# Patient Record
Sex: FEMALE | Race: WHITE | NOT HISPANIC OR LATINO | Employment: FULL TIME | URBAN - METROPOLITAN AREA
[De-identification: names, ages, dates, MRNs, and addresses within clinical notes are randomized per-mention and may not be internally consistent; named-entity substitution may affect disease eponyms.]

---

## 2018-03-21 ENCOUNTER — TRANSCRIBE ORDERS (OUTPATIENT)
Dept: ADMINISTRATIVE | Facility: HOSPITAL | Age: 53
End: 2018-03-21

## 2018-03-21 DIAGNOSIS — R14.0 ABDOMINAL DISTENTION: Primary | ICD-10-CM

## 2018-03-30 ENCOUNTER — HOSPITAL ENCOUNTER (OUTPATIENT)
Dept: ULTRASOUND IMAGING | Facility: HOSPITAL | Age: 53
Discharge: HOME | End: 2018-03-30
Attending: COLON & RECTAL SURGERY | Admitting: COLON & RECTAL SURGERY

## 2018-03-30 DIAGNOSIS — R14.0 ABDOMINAL DISTENTION: ICD-10-CM

## 2018-03-30 PROCEDURE — 76705 ECHO EXAM OF ABDOMEN: CPT

## 2018-12-05 ENCOUNTER — HOSPITAL ENCOUNTER (OUTPATIENT)
Dept: CT IMAGING | Facility: HOSPITAL | Age: 53
Discharge: HOME | End: 2018-12-05
Attending: COLON & RECTAL SURGERY | Admitting: COLON & RECTAL SURGERY

## 2018-12-05 ENCOUNTER — TRANSCRIBE ORDERS (OUTPATIENT)
Dept: ADMINISTRATIVE | Facility: HOSPITAL | Age: 53
End: 2018-12-05

## 2018-12-05 DIAGNOSIS — K57.90 DIVERTICULOSIS OF INTESTINE WITHOUT PERFORATION OR ABSCESS WITHOUT BLEEDING: ICD-10-CM

## 2018-12-05 DIAGNOSIS — K57.90 DIVERTICULOSIS OF INTESTINE WITHOUT PERFORATION OR ABSCESS WITHOUT BLEEDING: Primary | ICD-10-CM

## 2018-12-05 PROCEDURE — 0 DIATRIZOATE MEGLUMINE & SODIUM PER 1 ML: Performed by: COLON & RECTAL SURGERY

## 2018-12-05 PROCEDURE — 25010000002 IOPAMIDOL 61 % SOLUTION: Performed by: COLON & RECTAL SURGERY

## 2018-12-05 PROCEDURE — 74177 CT ABD & PELVIS W/CONTRAST: CPT

## 2018-12-05 PROCEDURE — 82565 ASSAY OF CREATININE: CPT

## 2018-12-05 RX ADMIN — Medication 15 ML: at 16:10

## 2018-12-05 RX ADMIN — IOPAMIDOL 95 ML: 612 INJECTION, SOLUTION INTRAVENOUS at 17:34

## 2018-12-06 LAB — CREAT BLDA-MCNC: 0.8 MG/DL (ref 0.6–1.3)

## 2025-02-16 ENCOUNTER — APPOINTMENT (OUTPATIENT)
Dept: CT IMAGING | Facility: HOSPITAL | Age: 60
End: 2025-02-16
Payer: COMMERCIAL

## 2025-02-16 ENCOUNTER — APPOINTMENT (OUTPATIENT)
Dept: GENERAL RADIOLOGY | Facility: HOSPITAL | Age: 60
End: 2025-02-16
Payer: COMMERCIAL

## 2025-02-16 ENCOUNTER — HOSPITAL ENCOUNTER (OUTPATIENT)
Facility: HOSPITAL | Age: 60
Setting detail: OBSERVATION
Discharge: HOME | End: 2025-02-17
Attending: EMERGENCY MEDICINE | Admitting: HOSPITALIST
Payer: COMMERCIAL

## 2025-02-16 ENCOUNTER — APPOINTMENT (OUTPATIENT)
Dept: ULTRASOUND IMAGING | Facility: HOSPITAL | Age: 60
End: 2025-02-16
Payer: COMMERCIAL

## 2025-02-16 DIAGNOSIS — A09 INFECTIOUS ENTERITIS, UNSPECIFIED INFECTIOUS AGENT: Primary | ICD-10-CM

## 2025-02-16 DIAGNOSIS — R10.9 INTRACTABLE ABDOMINAL PAIN: ICD-10-CM

## 2025-02-16 DIAGNOSIS — K52.9 ENTERITIS: ICD-10-CM

## 2025-02-16 DIAGNOSIS — R10.9 ACUTE ABDOMINAL PAIN: ICD-10-CM

## 2025-02-16 LAB
ALBUMIN SERPL-MCNC: 4.1 G/DL (ref 3.5–5.2)
ALBUMIN/GLOB SERPL: 1.5 G/DL
ALP SERPL-CCNC: 94 U/L (ref 39–117)
ALT SERPL W P-5'-P-CCNC: 10 U/L (ref 1–33)
ANION GAP SERPL CALCULATED.3IONS-SCNC: 11 MMOL/L (ref 5–15)
AST SERPL-CCNC: 12 U/L (ref 1–32)
BACTERIA UR QL AUTO: ABNORMAL /HPF
BASOPHILS # BLD AUTO: 0.07 10*3/MM3 (ref 0–0.2)
BASOPHILS NFR BLD AUTO: 0.3 % (ref 0–1.5)
BILIRUB SERPL-MCNC: 0.5 MG/DL (ref 0–1.2)
BILIRUB UR QL STRIP: NEGATIVE
BUN SERPL-MCNC: 25 MG/DL (ref 8–23)
BUN/CREAT SERPL: 19.4 (ref 7–25)
CALCIUM SPEC-SCNC: 9.6 MG/DL (ref 8.6–10.5)
CHLORIDE SERPL-SCNC: 98 MMOL/L (ref 98–107)
CLARITY UR: ABNORMAL
CO2 SERPL-SCNC: 27 MMOL/L (ref 22–29)
COLOR UR: YELLOW
CREAT SERPL-MCNC: 1.29 MG/DL (ref 0.57–1)
D-LACTATE SERPL-SCNC: 1.6 MMOL/L (ref 0.5–2)
DEPRECATED RDW RBC AUTO: 40.7 FL (ref 37–54)
EGFRCR SERPLBLD CKD-EPI 2021: 47.3 ML/MIN/1.73
EOSINOPHIL # BLD AUTO: 0.11 10*3/MM3 (ref 0–0.4)
EOSINOPHIL NFR BLD AUTO: 0.5 % (ref 0.3–6.2)
ERYTHROCYTE [DISTWIDTH] IN BLOOD BY AUTOMATED COUNT: 13 % (ref 12.3–15.4)
GEN 5 1HR TROPONIN T REFLEX: 9 NG/L
GLOBULIN UR ELPH-MCNC: 2.7 GM/DL
GLUCOSE SERPL-MCNC: 127 MG/DL (ref 65–99)
GLUCOSE UR STRIP-MCNC: NEGATIVE MG/DL
HCT VFR BLD AUTO: 48.5 % (ref 34–46.6)
HGB BLD-MCNC: 15.9 G/DL (ref 12–15.9)
HGB UR QL STRIP.AUTO: ABNORMAL
HOLD SPECIMEN: NORMAL
HYALINE CASTS UR QL AUTO: ABNORMAL /LPF
IMM GRANULOCYTES # BLD AUTO: 0.1 10*3/MM3 (ref 0–0.05)
IMM GRANULOCYTES NFR BLD AUTO: 0.5 % (ref 0–0.5)
KETONES UR QL STRIP: ABNORMAL
LEUKOCYTE ESTERASE UR QL STRIP.AUTO: ABNORMAL
LIPASE SERPL-CCNC: 51 U/L (ref 13–60)
LYMPHOCYTES # BLD AUTO: 3.35 10*3/MM3 (ref 0.7–3.1)
LYMPHOCYTES NFR BLD AUTO: 15.8 % (ref 19.6–45.3)
MCH RBC QN AUTO: 28.4 PG (ref 26.6–33)
MCHC RBC AUTO-ENTMCNC: 32.8 G/DL (ref 31.5–35.7)
MCV RBC AUTO: 86.8 FL (ref 79–97)
MONOCYTES # BLD AUTO: 0.87 10*3/MM3 (ref 0.1–0.9)
MONOCYTES NFR BLD AUTO: 4.1 % (ref 5–12)
NEUTROPHILS NFR BLD AUTO: 16.75 10*3/MM3 (ref 1.7–7)
NEUTROPHILS NFR BLD AUTO: 78.8 % (ref 42.7–76)
NITRITE UR QL STRIP: NEGATIVE
NRBC BLD AUTO-RTO: 0 /100 WBC (ref 0–0.2)
PH UR STRIP.AUTO: <=5 [PH] (ref 5–8)
PLATELET # BLD AUTO: 480 10*3/MM3 (ref 140–450)
PMV BLD AUTO: 9.1 FL (ref 6–12)
POTASSIUM SERPL-SCNC: 4.4 MMOL/L (ref 3.5–5.2)
PROT SERPL-MCNC: 6.8 G/DL (ref 6–8.5)
PROT UR QL STRIP: ABNORMAL
RBC # BLD AUTO: 5.59 10*6/MM3 (ref 3.77–5.28)
RBC # UR STRIP: ABNORMAL /HPF
REF LAB TEST METHOD: ABNORMAL
SODIUM SERPL-SCNC: 136 MMOL/L (ref 136–145)
SP GR UR STRIP: 1.02 (ref 1–1.03)
SQUAMOUS #/AREA URNS HPF: ABNORMAL /HPF
TROPONIN T % DELTA: -40
TROPONIN T NUMERIC DELTA: -6 NG/L
TROPONIN T SERPL HS-MCNC: 15 NG/L
UROBILINOGEN UR QL STRIP: ABNORMAL
WBC # UR STRIP: ABNORMAL /HPF
WBC NRBC COR # BLD AUTO: 21.25 10*3/MM3 (ref 3.4–10.8)
WHOLE BLOOD HOLD COAG: NORMAL
WHOLE BLOOD HOLD SPECIMEN: NORMAL

## 2025-02-16 PROCEDURE — 76705 ECHO EXAM OF ABDOMEN: CPT

## 2025-02-16 PROCEDURE — 93005 ELECTROCARDIOGRAM TRACING: CPT | Performed by: EMERGENCY MEDICINE

## 2025-02-16 PROCEDURE — G0378 HOSPITAL OBSERVATION PER HR: HCPCS

## 2025-02-16 PROCEDURE — 87040 BLOOD CULTURE FOR BACTERIA: CPT | Performed by: EMERGENCY MEDICINE

## 2025-02-16 PROCEDURE — 84484 ASSAY OF TROPONIN QUANT: CPT | Performed by: EMERGENCY MEDICINE

## 2025-02-16 PROCEDURE — 83605 ASSAY OF LACTIC ACID: CPT | Performed by: EMERGENCY MEDICINE

## 2025-02-16 PROCEDURE — 25810000003 SODIUM CHLORIDE 0.9 % SOLUTION: Performed by: EMERGENCY MEDICINE

## 2025-02-16 PROCEDURE — 25510000001 IOPAMIDOL 61 % SOLUTION: Performed by: EMERGENCY MEDICINE

## 2025-02-16 PROCEDURE — 85025 COMPLETE CBC W/AUTO DIFF WBC: CPT | Performed by: EMERGENCY MEDICINE

## 2025-02-16 PROCEDURE — 96367 TX/PROPH/DG ADDL SEQ IV INF: CPT

## 2025-02-16 PROCEDURE — 99285 EMERGENCY DEPT VISIT HI MDM: CPT

## 2025-02-16 PROCEDURE — 25010000002 METRONIDAZOLE 500 MG/100ML SOLUTION: Performed by: EMERGENCY MEDICINE

## 2025-02-16 PROCEDURE — 87086 URINE CULTURE/COLONY COUNT: CPT | Performed by: EMERGENCY MEDICINE

## 2025-02-16 PROCEDURE — 36415 COLL VENOUS BLD VENIPUNCTURE: CPT

## 2025-02-16 PROCEDURE — 83690 ASSAY OF LIPASE: CPT | Performed by: EMERGENCY MEDICINE

## 2025-02-16 PROCEDURE — 71045 X-RAY EXAM CHEST 1 VIEW: CPT

## 2025-02-16 PROCEDURE — 74177 CT ABD & PELVIS W/CONTRAST: CPT

## 2025-02-16 PROCEDURE — 96365 THER/PROPH/DIAG IV INF INIT: CPT

## 2025-02-16 PROCEDURE — 81001 URINALYSIS AUTO W/SCOPE: CPT | Performed by: EMERGENCY MEDICINE

## 2025-02-16 PROCEDURE — 25810000003 SODIUM CHLORIDE 0.9 % SOLUTION: Performed by: INTERNAL MEDICINE

## 2025-02-16 PROCEDURE — 96375 TX/PRO/DX INJ NEW DRUG ADDON: CPT

## 2025-02-16 PROCEDURE — 25010000002 ONDANSETRON PER 1 MG: Performed by: EMERGENCY MEDICINE

## 2025-02-16 PROCEDURE — 80053 COMPREHEN METABOLIC PANEL: CPT | Performed by: EMERGENCY MEDICINE

## 2025-02-16 PROCEDURE — 25010000002 MORPHINE PER 10 MG: Performed by: EMERGENCY MEDICINE

## 2025-02-16 PROCEDURE — 25010000002 CEFEPIME PER 500 MG: Performed by: EMERGENCY MEDICINE

## 2025-02-16 RX ORDER — METOCLOPRAMIDE HYDROCHLORIDE 5 MG/ML
10 INJECTION INTRAMUSCULAR; INTRAVENOUS ONCE
Status: DISCONTINUED | OUTPATIENT
Start: 2025-02-17 | End: 2025-02-17 | Stop reason: HOSPADM

## 2025-02-16 RX ORDER — SODIUM CHLORIDE 0.9 % (FLUSH) 0.9 %
10 SYRINGE (ML) INJECTION EVERY 12 HOURS SCHEDULED
Status: DISCONTINUED | OUTPATIENT
Start: 2025-02-16 | End: 2025-02-17 | Stop reason: HOSPADM

## 2025-02-16 RX ORDER — SODIUM CHLORIDE 0.9 % (FLUSH) 0.9 %
10 SYRINGE (ML) INJECTION AS NEEDED
Status: DISCONTINUED | OUTPATIENT
Start: 2025-02-16 | End: 2025-02-17 | Stop reason: HOSPADM

## 2025-02-16 RX ORDER — NITROGLYCERIN 0.4 MG/1
0.4 TABLET SUBLINGUAL
Status: DISCONTINUED | OUTPATIENT
Start: 2025-02-16 | End: 2025-02-17 | Stop reason: HOSPADM

## 2025-02-16 RX ORDER — LISINOPRIL 40 MG/1
20 TABLET ORAL DAILY
COMMUNITY
Start: 2025-01-07

## 2025-02-16 RX ORDER — MORPHINE SULFATE 2 MG/ML
2 INJECTION, SOLUTION INTRAMUSCULAR; INTRAVENOUS EVERY 4 HOURS PRN
Status: DISCONTINUED | OUTPATIENT
Start: 2025-02-16 | End: 2025-02-17 | Stop reason: HOSPADM

## 2025-02-16 RX ORDER — SODIUM CHLORIDE 9 MG/ML
40 INJECTION, SOLUTION INTRAVENOUS AS NEEDED
Status: DISCONTINUED | OUTPATIENT
Start: 2025-02-16 | End: 2025-02-17 | Stop reason: HOSPADM

## 2025-02-16 RX ORDER — SODIUM CHLORIDE 9 MG/ML
10 INJECTION, SOLUTION INTRAMUSCULAR; INTRAVENOUS; SUBCUTANEOUS AS NEEDED
Status: DISCONTINUED | OUTPATIENT
Start: 2025-02-16 | End: 2025-02-17 | Stop reason: HOSPADM

## 2025-02-16 RX ORDER — HYDROCHLOROTHIAZIDE 12.5 MG/1
12.5 CAPSULE ORAL EVERY MORNING
COMMUNITY
Start: 2024-11-07

## 2025-02-16 RX ORDER — ASPIRIN 81 MG/1
1 TABLET ORAL DAILY
COMMUNITY

## 2025-02-16 RX ORDER — MORPHINE SULFATE 4 MG/ML
4 INJECTION, SOLUTION INTRAMUSCULAR; INTRAVENOUS ONCE
Status: COMPLETED | OUTPATIENT
Start: 2025-02-16 | End: 2025-02-16

## 2025-02-16 RX ORDER — SODIUM CHLORIDE 9 MG/ML
100 INJECTION, SOLUTION INTRAVENOUS CONTINUOUS
Status: ACTIVE | OUTPATIENT
Start: 2025-02-16 | End: 2025-02-17

## 2025-02-16 RX ORDER — METRONIDAZOLE 500 MG/100ML
500 INJECTION, SOLUTION INTRAVENOUS ONCE
Status: COMPLETED | OUTPATIENT
Start: 2025-02-16 | End: 2025-02-16

## 2025-02-16 RX ORDER — LEVOTHYROXINE SODIUM 100 UG/1
1 TABLET ORAL DAILY
COMMUNITY

## 2025-02-16 RX ORDER — ONDANSETRON 2 MG/ML
4 INJECTION INTRAMUSCULAR; INTRAVENOUS ONCE
Status: COMPLETED | OUTPATIENT
Start: 2025-02-16 | End: 2025-02-16

## 2025-02-16 RX ORDER — ACETAMINOPHEN 650 MG/1
650 SUPPOSITORY RECTAL EVERY 4 HOURS PRN
Status: DISCONTINUED | OUTPATIENT
Start: 2025-02-16 | End: 2025-02-17 | Stop reason: HOSPADM

## 2025-02-16 RX ORDER — HYDROXYZINE HYDROCHLORIDE 10 MG/1
1 TABLET, FILM COATED ORAL 3 TIMES DAILY
COMMUNITY

## 2025-02-16 RX ORDER — IOPAMIDOL 612 MG/ML
100 INJECTION, SOLUTION INTRAVASCULAR
Status: COMPLETED | OUTPATIENT
Start: 2025-02-16 | End: 2025-02-16

## 2025-02-16 RX ORDER — HYDROCODONE BITARTRATE AND ACETAMINOPHEN 5; 325 MG/1; MG/1
1 TABLET ORAL EVERY 6 HOURS PRN
Status: DISCONTINUED | OUTPATIENT
Start: 2025-02-16 | End: 2025-02-17 | Stop reason: HOSPADM

## 2025-02-16 RX ORDER — DILTIAZEM HYDROCHLORIDE EXTENDED-RELEASE TABLETS 300 MG/1
1 TABLET, EXTENDED RELEASE ORAL DAILY
COMMUNITY
Start: 2025-02-10

## 2025-02-16 RX ORDER — NALOXONE HCL 0.4 MG/ML
0.4 VIAL (ML) INJECTION
Status: DISCONTINUED | OUTPATIENT
Start: 2025-02-16 | End: 2025-02-17 | Stop reason: HOSPADM

## 2025-02-16 RX ORDER — ONDANSETRON 2 MG/ML
4 INJECTION INTRAMUSCULAR; INTRAVENOUS EVERY 6 HOURS PRN
Status: DISCONTINUED | OUTPATIENT
Start: 2025-02-16 | End: 2025-02-17 | Stop reason: HOSPADM

## 2025-02-16 RX ORDER — ACETAMINOPHEN 160 MG/5ML
650 SOLUTION ORAL EVERY 4 HOURS PRN
Status: DISCONTINUED | OUTPATIENT
Start: 2025-02-16 | End: 2025-02-17 | Stop reason: HOSPADM

## 2025-02-16 RX ORDER — ACETAMINOPHEN 325 MG/1
650 TABLET ORAL EVERY 4 HOURS PRN
Status: DISCONTINUED | OUTPATIENT
Start: 2025-02-16 | End: 2025-02-17 | Stop reason: HOSPADM

## 2025-02-16 RX ADMIN — SODIUM CHLORIDE 1000 ML: 9 INJECTION, SOLUTION INTRAVENOUS at 19:25

## 2025-02-16 RX ADMIN — HYDROCODONE BITARTRATE AND ACETAMINOPHEN 1 TABLET: 5; 325 TABLET ORAL at 22:19

## 2025-02-16 RX ADMIN — ONDANSETRON 4 MG: 2 INJECTION INTRAMUSCULAR; INTRAVENOUS at 19:27

## 2025-02-16 RX ADMIN — MORPHINE SULFATE 4 MG: 4 INJECTION, SOLUTION INTRAMUSCULAR; INTRAVENOUS at 19:27

## 2025-02-16 RX ADMIN — CEFEPIME 2000 MG: 2 INJECTION, POWDER, FOR SOLUTION INTRAVENOUS at 20:17

## 2025-02-16 RX ADMIN — SODIUM CHLORIDE 100 ML/HR: 9 INJECTION, SOLUTION INTRAVENOUS at 23:15

## 2025-02-16 RX ADMIN — METRONIDAZOLE 500 MG: 500 INJECTION, SOLUTION INTRAVENOUS at 20:58

## 2025-02-16 RX ADMIN — IOPAMIDOL 75 ML: 612 INJECTION, SOLUTION INTRAVENOUS at 19:43

## 2025-02-17 VITALS
HEART RATE: 65 BPM | BODY MASS INDEX: 30.12 KG/M2 | HEIGHT: 63 IN | DIASTOLIC BLOOD PRESSURE: 72 MMHG | TEMPERATURE: 98 F | OXYGEN SATURATION: 94 % | SYSTOLIC BLOOD PRESSURE: 114 MMHG | RESPIRATION RATE: 14 BRPM | WEIGHT: 170 LBS

## 2025-02-17 PROBLEM — K52.9 ENTERITIS: Status: RESOLVED | Noted: 2025-02-16 | Resolved: 2025-02-17

## 2025-02-17 LAB
ALBUMIN SERPL-MCNC: 3.4 G/DL (ref 3.5–5.2)
ALBUMIN/GLOB SERPL: 1.5 G/DL
ALP SERPL-CCNC: 73 U/L (ref 39–117)
ALT SERPL W P-5'-P-CCNC: 8 U/L (ref 1–33)
ANION GAP SERPL CALCULATED.3IONS-SCNC: 5 MMOL/L (ref 5–15)
AST SERPL-CCNC: 9 U/L (ref 1–32)
BACTERIA SPEC AEROBE CULT: ABNORMAL
BASOPHILS # BLD AUTO: 0.06 10*3/MM3 (ref 0–0.2)
BASOPHILS NFR BLD AUTO: 0.5 % (ref 0–1.5)
BILIRUB SERPL-MCNC: 0.5 MG/DL (ref 0–1.2)
BUN SERPL-MCNC: 19 MG/DL (ref 8–23)
BUN/CREAT SERPL: 19.6 (ref 7–25)
C DIFF TOX GENS STL QL NAA+PROBE: NOT DETECTED
CALCIUM SPEC-SCNC: 8.2 MG/DL (ref 8.6–10.5)
CHLORIDE SERPL-SCNC: 107 MMOL/L (ref 98–107)
CO2 SERPL-SCNC: 27 MMOL/L (ref 22–29)
CREAT SERPL-MCNC: 0.97 MG/DL (ref 0.57–1)
DEPRECATED RDW RBC AUTO: 42.5 FL (ref 37–54)
EGFRCR SERPLBLD CKD-EPI 2021: 66.6 ML/MIN/1.73
EOSINOPHIL # BLD AUTO: 0.36 10*3/MM3 (ref 0–0.4)
EOSINOPHIL NFR BLD AUTO: 3.2 % (ref 0.3–6.2)
ERYTHROCYTE [DISTWIDTH] IN BLOOD BY AUTOMATED COUNT: 13.1 % (ref 12.3–15.4)
GLOBULIN UR ELPH-MCNC: 2.2 GM/DL
GLUCOSE SERPL-MCNC: 99 MG/DL (ref 65–99)
HCT VFR BLD AUTO: 38.2 % (ref 34–46.6)
HGB BLD-MCNC: 12.3 G/DL (ref 12–15.9)
IMM GRANULOCYTES # BLD AUTO: 0.03 10*3/MM3 (ref 0–0.05)
IMM GRANULOCYTES NFR BLD AUTO: 0.3 % (ref 0–0.5)
LYMPHOCYTES # BLD AUTO: 4.25 10*3/MM3 (ref 0.7–3.1)
LYMPHOCYTES NFR BLD AUTO: 37.5 % (ref 19.6–45.3)
MAGNESIUM SERPL-MCNC: 2.2 MG/DL (ref 1.6–2.4)
MCH RBC QN AUTO: 28.7 PG (ref 26.6–33)
MCHC RBC AUTO-ENTMCNC: 32.2 G/DL (ref 31.5–35.7)
MCV RBC AUTO: 89.3 FL (ref 79–97)
MONOCYTES # BLD AUTO: 0.74 10*3/MM3 (ref 0.1–0.9)
MONOCYTES NFR BLD AUTO: 6.5 % (ref 5–12)
NEUTROPHILS NFR BLD AUTO: 5.88 10*3/MM3 (ref 1.7–7)
NEUTROPHILS NFR BLD AUTO: 52 % (ref 42.7–76)
NRBC BLD AUTO-RTO: 0 /100 WBC (ref 0–0.2)
PLATELET # BLD AUTO: 295 10*3/MM3 (ref 140–450)
PMV BLD AUTO: 9.2 FL (ref 6–12)
POTASSIUM SERPL-SCNC: 3.7 MMOL/L (ref 3.5–5.2)
PROT SERPL-MCNC: 5.6 G/DL (ref 6–8.5)
QT INTERVAL: 422 MS
QTC INTERVAL: 455 MS
RBC # BLD AUTO: 4.28 10*6/MM3 (ref 3.77–5.28)
SODIUM SERPL-SCNC: 139 MMOL/L (ref 136–145)
WBC NRBC COR # BLD AUTO: 11.32 10*3/MM3 (ref 3.4–10.8)

## 2025-02-17 PROCEDURE — 87493 C DIFF AMPLIFIED PROBE: CPT | Performed by: EMERGENCY MEDICINE

## 2025-02-17 PROCEDURE — 80053 COMPREHEN METABOLIC PANEL: CPT | Performed by: INTERNAL MEDICINE

## 2025-02-17 PROCEDURE — 99204 OFFICE O/P NEW MOD 45 MIN: CPT | Performed by: NURSE PRACTITIONER

## 2025-02-17 PROCEDURE — 25010000002 MORPHINE PER 10 MG: Performed by: INTERNAL MEDICINE

## 2025-02-17 PROCEDURE — 96376 TX/PRO/DX INJ SAME DRUG ADON: CPT

## 2025-02-17 PROCEDURE — 25810000003 SODIUM CHLORIDE 0.9 % SOLUTION: Performed by: INTERNAL MEDICINE

## 2025-02-17 PROCEDURE — 83735 ASSAY OF MAGNESIUM: CPT | Performed by: INTERNAL MEDICINE

## 2025-02-17 PROCEDURE — 25010000002 ONDANSETRON PER 1 MG: Performed by: INTERNAL MEDICINE

## 2025-02-17 PROCEDURE — G0378 HOSPITAL OBSERVATION PER HR: HCPCS

## 2025-02-17 PROCEDURE — 85025 COMPLETE CBC W/AUTO DIFF WBC: CPT | Performed by: INTERNAL MEDICINE

## 2025-02-17 RX ORDER — ONDANSETRON 4 MG/1
4 TABLET, ORALLY DISINTEGRATING ORAL EVERY 8 HOURS PRN
Qty: 15 TABLET | Refills: 0 | Status: SHIPPED | OUTPATIENT
Start: 2025-02-17

## 2025-02-17 RX ORDER — DICYCLOMINE HYDROCHLORIDE 10 MG/1
10 CAPSULE ORAL 4 TIMES DAILY PRN
Qty: 20 CAPSULE | Refills: 0 | Status: SHIPPED | OUTPATIENT
Start: 2025-02-17

## 2025-02-17 RX ORDER — ASPIRIN 81 MG/1
81 TABLET ORAL DAILY
Status: DISCONTINUED | OUTPATIENT
Start: 2025-02-17 | End: 2025-02-17 | Stop reason: HOSPADM

## 2025-02-17 RX ORDER — LISINOPRIL 20 MG/1
20 TABLET ORAL DAILY
Status: DISCONTINUED | OUTPATIENT
Start: 2025-02-18 | End: 2025-02-17 | Stop reason: HOSPADM

## 2025-02-17 RX ORDER — LEVOTHYROXINE SODIUM 100 UG/1
100 TABLET ORAL DAILY
Status: DISCONTINUED | OUTPATIENT
Start: 2025-02-17 | End: 2025-02-17 | Stop reason: HOSPADM

## 2025-02-17 RX ORDER — DICYCLOMINE HYDROCHLORIDE 10 MG/1
10 CAPSULE ORAL 4 TIMES DAILY
Status: DISCONTINUED | OUTPATIENT
Start: 2025-02-17 | End: 2025-02-17 | Stop reason: HOSPADM

## 2025-02-17 RX ADMIN — MORPHINE SULFATE 2 MG: 2 INJECTION, SOLUTION INTRAMUSCULAR; INTRAVENOUS at 07:50

## 2025-02-17 RX ADMIN — Medication 10 ML: at 09:32

## 2025-02-17 RX ADMIN — SODIUM CHLORIDE 100 ML/HR: 9 INJECTION, SOLUTION INTRAVENOUS at 09:31

## 2025-02-17 RX ADMIN — MORPHINE SULFATE 2 MG: 2 INJECTION, SOLUTION INTRAMUSCULAR; INTRAVENOUS at 01:13

## 2025-02-17 RX ADMIN — ONDANSETRON 4 MG: 2 INJECTION INTRAMUSCULAR; INTRAVENOUS at 07:50

## 2025-02-17 RX ADMIN — ONDANSETRON 4 MG: 2 INJECTION INTRAMUSCULAR; INTRAVENOUS at 01:13

## 2025-02-17 RX ADMIN — HYDROCODONE BITARTRATE AND ACETAMINOPHEN 1 TABLET: 5; 325 TABLET ORAL at 13:32

## 2025-02-17 NOTE — ED NOTES
" Cammie BYRD Maix    Nursing Report ED to Floor:  Mental status: alert and oriented x4  Ambulatory status: adlib  Oxygen Therapy:  room air  Cardiac Rhythm: normal sinus  Admitted from: ed/home  Safety Concerns:  na  Precautions: c-diff rule out  Social Issues: na  ED Room #:  33    ED Nurse Phone Extension - [phone] or may call [phone].      HPI:   Chief Complaint   Patient presents with    Abdominal Pain       Past Medical History:  No past medical history on file.     Past Surgical History:  No past surgical history on file.     Admitting Doctor:   Guzman Wallace III, DO    Consulting Provider(s):  Consults       Date and Time Order Name Status Description    2/16/2025  8:29 PM Inpatient Gastroenterology Consult               Admitting Diagnosis:   The primary encounter diagnosis was Infectious enteritis, unspecified infectious agent. Diagnoses of Acute abdominal pain and Intractable abdominal pain were also pertinent to this visit.    Most Recent Vitals:   Vitals:    02/16/25 1801 02/16/25 1823 02/16/25 1840   BP: 119/82 115/53 125/77   BP Location: Left arm     Patient Position: Sitting     Pulse: 88 78 76   Resp: 16     Temp: 98.5 °F (36.9 °C)     TempSrc: Oral     SpO2: 95% 96% 98%   Weight: 77.1 kg (170 lb)     Height: 160 cm (63\")         Active LDAs/IV Access:   Lines, Drains & Airways       Active LDAs       Name Placement date Placement time Site Days    Peripheral IV 02/16/25 1821 Anterior;Right Forearm 02/16/25  1821  Forearm  less than 1                    Labs (abnormal labs have a star):   Labs Reviewed   COMPREHENSIVE METABOLIC PANEL - Abnormal; Notable for the following components:       Result Value    Glucose 127 (*)     BUN 25 (*)     Creatinine 1.29 (*)     eGFR 47.3 (*)     All other components within normal limits    Narrative:     GFR Categories in Chronic Kidney Disease (CKD)      GFR Category          GFR (mL/min/1.73)    Interpretation  G1                     90 or greater         Normal or " high (1)  G2                      60-89                Mild decrease (1)  G3a                   45-59                Mild to moderate decrease  G3b                   30-44                Moderate to severe decrease  G4                    15-29                Severe decrease  G5                    14 or less           Kidney failure          (1)In the absence of evidence of kidney disease, neither GFR category G1 or G2 fulfill the criteria for CKD.    eGFR calculation 2021 CKD-EPI creatinine equation, which does not include race as a factor   URINALYSIS W/ MICROSCOPIC IF INDICATED (NO CULTURE) - Abnormal; Notable for the following components:    Appearance, UA Cloudy (*)     Ketones, UA Trace (*)     Blood, UA Trace (*)     Protein,  mg/dL (2+) (*)     Leuk Esterase, UA Trace (*)     All other components within normal limits   CBC WITH AUTO DIFFERENTIAL - Abnormal; Notable for the following components:    WBC 21.25 (*)     RBC 5.59 (*)     Hematocrit 48.5 (*)     Platelets 480 (*)     Neutrophil % 78.8 (*)     Lymphocyte % 15.8 (*)     Monocyte % 4.1 (*)     Neutrophils, Absolute 16.75 (*)     Lymphocytes, Absolute 3.35 (*)     Immature Grans, Absolute 0.10 (*)     All other components within normal limits   URINALYSIS, MICROSCOPIC ONLY - Abnormal; Notable for the following components:    WBC, UA 6-10 (*)     Bacteria, UA 1+ (*)     Squamous Epithelial Cells, UA 7-12 (*)     All other components within normal limits   TROPONIN - Abnormal; Notable for the following components:    HS Troponin T 15 (*)     All other components within normal limits    Narrative:     High Sensitive Troponin T Reference Range:  <14.0 ng/L- Negative Female for AMI  <22.0 ng/L- Negative Male for AMI  >=14 - Abnormal Female indicating possible myocardial injury.  >=22 - Abnormal Male indicating possible myocardial injury.   Clinicians would have to utilize clinical acumen, EKG, Troponin, and serial changes to determine if it is an Acute  Myocardial Infarction or myocardial injury due to an underlying chronic condition.        LIPASE - Normal   LACTIC ACID, PLASMA - Normal   URINE CULTURE   BLOOD CULTURE   BLOOD CULTURE   CLOSTRIDIOIDES DIFFICILE TOXIN    Narrative:     The following orders were created for panel order Clostridioides difficile Toxin - Stool, Per Rectum.  Procedure                               Abnormality         Status                     ---------                               -----------         ------                     Clostridioides difficile...[[id number]]                                                   Please view results for these tests on the individual orders.   GASTROINTESTINAL PANEL, PCR (PREFERRED) DOES NOT INCLUDE CDIFF   CLOSTRIDIOIDES DIFFICILE TOXIN, PCR   RAINBOW DRAW    Narrative:     The following orders were created for panel order [place] Draw.  Procedure                               Abnormality         Status                     ---------                               -----------         ------                     Green Top (Gel)[[id number]]                                  Final result               Lavender Top[[id number]]                                     Final result               Gold Top - SST[[id number]]                                   Final result               [place] Top[[id number]]                                         Final result               Light Blue Top[[id number]]                                   Final result                 Please view results for these tests on the individual orders.   HIGH SENSITIVITIY TROPONIN T 1HR    Narrative:     High Sensitive Troponin T Reference Range:  <14.0 ng/L- Negative Female for AMI  <22.0 ng/L- Negative Male for AMI  >=14 - Abnormal Female indicating possible myocardial injury.  >=22 - Abnormal Male indicating possible myocardial injury.   Clinicians would have to utilize clinical acumen, EKG, Troponin, and serial changes to determine if it is an Acute  Myocardial Infarction or myocardial injury due to an underlying chronic condition.        CBC AND DIFFERENTIAL    Narrative:     The following orders were created for panel order CBC & Differential.  Procedure                               Abnormality         Status                     ---------                               -----------         ------                     CBC Auto Differential[[id number]]        Abnormal            Final result                 Please view results for these tests on the individual orders.   GREEN TOP   LAVENDER TOP   GOLD TOP - SST   GRAY TOP   LIGHT BLUE TOP       Meds Given in ED:   Medications   Sodium Chloride (PF) 0.9 % 10 mL (has no administration in time range)   sodium chloride 0.9 % flush 10 mL (has no administration in time range)   metroNIDAZOLE (FLAGYL) IVPB 500 mg (has no administration in time range)   sodium chloride 0.9 % flush 10 mL (has no administration in time range)   sodium chloride 0.9 % flush 10 mL (has no administration in time range)   sodium chloride 0.9 % infusion 40 mL (has no administration in time range)   nitroglycerin (NITROSTAT) SL tablet 0.4 mg (has no administration in time range)   sodium chloride 0.9 % infusion (has no administration in time range)   Potassium Replacement - Follow Nurse / BPA Driven Protocol (has no administration in time range)   Magnesium Standard Dose Replacement - Follow Nurse / BPA Driven Protocol (has no administration in time range)   Phosphorus Replacement - Follow Nurse / BPA Driven Protocol (has no administration in time range)   Calcium Replacement - Follow Nurse / BPA Driven Protocol (has no administration in time range)   acetaminophen (TYLENOL) tablet 650 mg (has no administration in time range)     Or   acetaminophen (TYLENOL) 160 MG/5ML oral solution 650 mg (has no administration in time range)     Or   acetaminophen (TYLENOL) suppository 650 mg (has no administration in time range)   HYDROcodone-acetaminophen  (NORCO) 5-325 MG per tablet 1 tablet (has no administration in time range)   morphine injection 2 mg (has no administration in time range)     And   naloxone (NARCAN) injection 0.4 mg (has no administration in time range)   melatonin tablet 5 mg (has no administration in time range)   ondansetron (ZOFRAN) injection 4 mg (has no administration in time range)   sodium chloride 0.9 % bolus 1,000 mL (1,000 mL Intravenous New Bag 2/16/25 1925)   Morphine sulfate (PF) injection 4 mg (4 mg Intravenous Given 2/16/25 1927)   ondansetron (ZOFRAN) injection 4 mg (4 mg Intravenous Given 2/16/25 1927)   iopamidol (ISOVUE-300) 61 % injection 100 mL (75 mL Intravenous Given 2/16/25 1943)   cefepime 2000 mg IVPB in 100 mL NS (MBP) (2,000 mg Intravenous New Bag 2/16/25 2017)     sodium chloride, 100 mL/hr         Last NIH score:                                                          Dysphagia screening results:        Jo-Ann Coma Scale:  No data recorded     CIWA:        Restraint Type:            Isolation Status:  No active isolations

## 2025-02-17 NOTE — H&P
"    [facility] Medicine Services  HISTORY AND PHYSICAL    Patient Name: Cammie German  : 1965  MRN: [id number]  Primary Care Physician: Romulo Woody MD  Date of admission: 2025      Subjective   Subjective     Chief Complaint:  Abdominal pain    HPI:  Cammie German is a 59 y.o. female who states that she ate at a restaurant on Friday (2 days ago) and within 2 hours developed sensation of bloating in her abdomen.  She states that this was soon followed by nausea and eventually emesis (no hina blood).  The nausea continued into Saturday (yesterday), at which time she states she had onset of marked fatigue/malaise, and then onset of diarrhea.  She describes the diarrhea as \"light brown water.\"  She states she cannot keep any food or fluids down, as this causes her to have immediate nausea/emesis and diarrhea.  She had an episode of chills yesterday and today () but did not take a temperature at home.  She states she has had abdominal pain for the last 2 days which she describes as cramping, located all across the anterior aspect of her abdomen.  She also confirms that she recently finished a 10-day course of antibiotics for an upper respiratory infection.  She denies chest pain, shortness of breath, slurred speech/facial droop, dizziness, lightheadedness, or syncope.  Her medical history significant for hypertension, hypothyroidism, and breast cancer in  for which treatment is complete.      Personal History     Past medical history is as above in the HPI.        Family History: Mother had diabetes, father had prostate cancer    Social History:  She is a former smoker, rare alcohol use, no street drug/illicit substance use.  Social History     Social History Narrative    Not on file       Medications:  Available home medication information reviewed.       No Known Allergies    Objective   Objective     Vital Signs:   Temp:  [98.5 °F (36.9 °C)] 98.5 °F (36.9 " °C)  Heart Rate:  [76-88] 76  Resp:  [16] 16  BP: (115-125)/(53-82) 125/77       Physical Exam   Constitutional: Awake, alert, NAD, pleasant.  Eyes: PERRLA, sclerae anicteric, no conjunctival injection  HENT: NCAT, mucous membranes dry.  Neck: Supple, no thyromegaly, no lymphadenopathy, trachea midline  Respiratory: Clear to auscultation bilaterally, nonlabored respirations   Cardiovascular: RRR, no murmurs, rubs, or gallops, palpable pedal pulses bilaterally  Gastrointestinal: Positive hypoactive bowel sounds, soft, generalized TTP, no peritoneal signs, nondistended  Musculoskeletal: No bilateral ankle edema, no clubbing or cyanosis to extremities  Psychiatric: Appropriate affect, cooperative  Neurologic: Oriented x 3, strength symmetric in all extremities, Cranial Nerves grossly intact to confrontation, speech clear  Skin: No rashes, normal turgor.    Result Review:  I have personally reviewed the results from the time of this admission to 2/16/2025 21:53 EST and agree with these findings:  [x]  Laboratory list / accordion  []  Microbiology  [x]  Radiology  [x]  EKG/Telemetry   []  Cardiology/Vascular   []  Pathology  []  Old records  []  Other:  Most notable findings include: Reviewed radiology report from abdominal ultrasound.  Reviewed radiology report from CT abdomen/pelvis.  Chest x-ray by my read shows nothing acute on this single AP view.  I reviewed EKG which by my read shows sinus rhythm, ventricular rate approximately 70 bpm, left axis, nonspecific ST/T wave changes.      LAB RESULTS:      Lab 02/16/25 1848 02/16/25  1821   WBC  --  21.25*   HEMOGLOBIN  --  15.9   HEMATOCRIT  --  48.5*   PLATELETS  --  480*   NEUTROS ABS  --  16.75*   IMMATURE GRANS (ABS)  --  0.10*   LYMPHS ABS  --  3.35*   MONOS ABS  --  0.87   EOS ABS  --  0.11   MCV  --  86.8   LACTATE 1.6  --          Lab 02/16/25  1848   SODIUM 136   POTASSIUM 4.4   CHLORIDE 98   CO2 27.0   ANION GAP 11.0   BUN 25*   CREATININE 1.29*   EGFR 47.3*    GLUCOSE 127*   CALCIUM 9.6         Lab 02/16/25 1848   TOTAL PROTEIN 6.8   ALBUMIN 4.1   GLOBULIN 2.7   ALT (SGPT) 10   AST (SGOT) 12   BILIRUBIN 0.5   ALK PHOS 94   LIPASE 51         Lab 02/16/25 2005 02/16/25  1848   HSTROP T 9 15*                 UA          2/16/2025    18:20   Urinalysis   Squamous Epithelial Cells, UA 7-12    Specific Gravity, UA 1.025    Ketones, UA Trace    Blood, UA Trace    Leukocytes, UA Trace    Nitrite, UA Negative    RBC, UA 0-2    WBC, UA 6-10    Bacteria, UA 1+        Microbiology Results (last 10 days)       ** No results found for the last 240 hours. **            XR Chest 1 View    Result Date: 2/16/2025  XR CHEST 1 VW Date of Exam: 2/16/2025 8:22 PM EST Indication: PREOP Comparison: None available. Findings: There are no airspace consolidations. No pleural fluid. No pneumothorax. The pulmonary vasculature appears within normal limits. The cardiac and mediastinal silhouette appear unremarkable. No acute osseous abnormality identified.     Impression: Impression: No active disease. Electronically Signed: Julio Cesar Smith MD  2/16/2025 8:27 PM EST  Workstation ID: [id number]    CT Abdomen Pelvis With Contrast    Result Date: 2/16/2025  CT ABDOMEN PELVIS W CONTRAST Date of Exam: 2/16/2025 7:16 PM EST Indication: UPPER ABDOMINAL PAIN. Comparison: December 5, 2018 Technique: Axial CT images were obtained of the abdomen and pelvis following the uneventful intravenous administration of 75 cc of Isovue-300 . Reconstructed coronal and sagittal images were also obtained. Automated exposure control and iterative construction methods were used. Findings: There is some ascites about the liver and spleen extending into the pelvis. There are low attenuating lesions in the liver suggestive of hepatic cyst. There is some hypodensity in the medial segment of the left lobe which has been noted and could relate to more focal fat. This is a common area for this. There is a vague area of decreased  attenuation in the right lobe which has been noted and has not significantly changed suggesting more likely benign process. This might relate to more focal fat or could be perfusion related. There is no abnormality of the spleen. Pancreas grossly unremarkable. There is no definite adrenal gland abnormality. There are bilateral renal lesions suggestive of cysts. There are tiny nonobstructing intrarenal calculi. The bladder is contracted. There are abnormal dilated small bowel loops with thickened small bowel wall. This complected nonspecific enteritis. This has been described on prior reports. Does the patient have a history of inflammatory bowel disease? There is some edema within the mesentery. There are degenerative changes involving the lumbar spine. It looks like there is a mesh device along the anterior right lower abdominal wall.     Impression: Impression: 1.There is some ascites within the abdomen and pelvis. 2.Abnormal appearance to the small bowel which could relate to nonspecific enteritis. Does the patient have a history of inflammatory bowel disease? Findings were described on a prior CT in February 2024 at outside institution. The images are not available for direct comparison. This could be seen with inflammatory infectious processes as well as ischemia. 3.Hepatic and renal cysts. 4.Nonobstructing intrarenal calculi. 5.Vague area of decreased attenuation to the right lobe of the liver which has been noted on prior imaging. 6.Degenerative change lumbar spine. Report called to Dr. Kauffman in the emergency room. Electronically Signed: Elijah German MD  2/16/2025 7:58 PM EST  Workstation ID: [id number]    US Gallbladder    Result Date: 2/16/2025  US GALLBLADDER INDICATION: Abdominal pain with diarrhea. COMPARISON: CT abdomen pelvis 12/5/2018, gallbladder ultrasound 3/30/2018 FINDINGS: PANCREAS: Visualized portions of the pancreas are within normal limits. LIVER: The liver has a coarsened heterogeneous  echotexture throughout, but no focal liver lesion is seen. This may be the sequela of chronic liver disease.  The intrahepatic bile ducts are normal in caliber. The common duct is normal in size at the glenn  hepatis measuring 3 mm. GALLBLADDER: No gallbladder wall thickening is seen, and there are no gallstones. There is a 4 mm gallbladder wall polyp. RIGHT KIDNEY: The right kidney measures 9.6 cm. There is a probable renal cyst measuring about 1.7 cm. This has some low-level echoes, possibly due to artifact. There are scattered echogenic foci in the kidney that could reflect nonobstructing stones. No  hydronephrosis. OTHER: A trace amount of free fluid may be present adjacent to the liver. Main portal vein is patent with a normal hepatopetal direction of flow.     Impression: 1. Heterogeneous coarsened hepatic echotexture without a discrete mass may reflect chronic liver disease. 2. 4 mm gallbladder wall polyp. No gallstones or gallbladder wall thickening. No biliary obstruction. 3. Probable right renal cyst with potential nonobstructing right kidney stones. 4. Follow-up with a contrast-enhanced CT is recommended for further evaluation of the liver and right renal lesion. Electronically Signed: Romulo Nagy MD  2/16/2025 7:33 PM EST  Workstation ID: [id number]         Assessment & Plan   Assessment & Plan       Enteritis      59F with enteritis, nausea/emesis, diarrhea, recent antibiotic use    Enteritis  Nausea/emesis  Diarrhea  Recent antibiotic use  - Awaiting stool sample to send for culture/viral panel and C. difficile assay.  - IVF with 0.9 NS.  - Received IV antibiotics in the ED, will defer further antibiotics until C. difficile assay is back.  - Nausea control with IV Zofran as needed.  - N.p.o./sips with meds.  - GI consult, will follow up recommendations.    Hypothyroidism  - None of her home medications have been entered at this time, will continue Synthroid at her home dose when her meds have been  verified and entered.    Hypertension  - Will continue home agents when her home medications have been verified and entered.    History of breast cancer  - She states this was over 20 years ago, treatment for this is complete, no acute issues.        Total time spent: 82 minutes  Time spent includes time reviewing chart, face-to-face time, counseling patient/family/caregiver, ordering medications/tests/procedures, communicating with other health care professionals, documenting clinical information in the electronic health record, and coordination of care.     VTE Prophylaxis:  Mechanical VTE prophylaxis orders are present.          CODE STATUS:  full  Code Status and Medical Interventions: CPR (Attempt to Resuscitate); Full Support   Ordered at: 02/16/25 2029     Level Of Support Discussed With:    Patient     Code Status (Patient has no pulse and is not breathing):    CPR (Attempt to Resuscitate)     Medical Interventions (Patient has pulse or is breathing):    Full Support       Expected Discharge   tbgearld Wallace,III, DO  02/16/25

## 2025-02-17 NOTE — PROGRESS NOTES
[facility] Medicine Services  PROGRESS NOTE    Patient Name: Cammie German  : 1965  MRN: [id number]    Date of Admission: 2025  Primary Care Physician: Romulo Woody MD    Subjective   Subjective     CC:  F/U abdominal pain, N/V/D    HPI:  Seen this morning. Feeling a little better today. No further vomiting, tolerating some clears.       Objective   Objective     Vital Signs:   Temp:  [97.7 °F (36.5 °C)-99 °F (37.2 °C)] 99 °F (37.2 °C)  Heart Rate:  [64-88] 65  Resp:  [16-18] 18  BP: (103-125)/(53-82) 114/65     Physical Exam:  Gen-no acute distress  HENT-NCAT, mucous membranes moist  CV-RRR, S1 S2 normal, no m/r/g  Resp-CTAB, no wheezes or rales  Abd-soft, mild diffuse TTP, ND, +BS  Ext-no edema  Neuro-A&Ox3, no focal deficits  Skin-no rashes  Psych-appropriate mood      Results Reviewed:  LAB RESULTS:      Lab 25  182   WBC 11.32*  --   --  21.25*   HEMOGLOBIN 12.3  --   --  15.9   HEMATOCRIT 38.2  --   --  48.5*   PLATELETS 295  --   --  480*   NEUTROS ABS 5.88  --   --  16.75*   IMMATURE GRANS (ABS) 0.03  --   --  0.10*   LYMPHS ABS 4.25*  --   --  3.35*   MONOS ABS 0.74  --   --  0.87   EOS ABS 0.36  --   --  0.11   MCV 89.3  --   --  86.8   LACTATE  --   --  1.6  --    HSTROP T  --  9 15*  --          Lab 25   SODIUM 139 136   POTASSIUM 3.7 4.4   CHLORIDE 107 98   CO2 27.0 27.0   ANION GAP 5.0 11.0   BUN 19 25*   CREATININE 0.97 1.29*   EGFR 66.6 47.3*   GLUCOSE 99 127*   CALCIUM 8.2* 9.6   MAGNESIUM 2.2  --          Lab 258   TOTAL PROTEIN 5.6* 6.8   ALBUMIN 3.4* 4.1   GLOBULIN 2.2 2.7   ALT (SGPT) 8 10   AST (SGOT) 9 12   BILIRUBIN 0.5 0.5   ALK PHOS 73 94   LIPASE  --  51         Lab 258   HSTROP T 9 15*                 Brief Urine Lab Results  (Last result in the past 365 days)        Color   Clarity   Blood   Leuk Est   Nitrite    Protein   CREAT   Urine HCG        02/16/25 1820 Yellow   Cloudy   Trace   Trace   Negative   100 mg/dL (2+)                   Microbiology Results Abnormal       Procedure Component Value - Date/Time    Urine Culture - Urine, Urine, Clean Catch [[id number]]  (Abnormal) Collected: 02/16/25 1820    Lab Status: Final result Specimen: Urine, Clean Catch Updated: 02/17/25 1152     Urine Culture <10,000 CFU/mL Gram Negative Bacilli     Comment: Call if further workup needed.        Narrative:      Colonization of the urinary tract without infection is common. Treatment is discouraged unless the patient is symptomatic, pregnant, or undergoing an invasive urologic procedure.            XR Chest 1 View    Result Date: 2/16/2025  XR CHEST 1 VW Date of Exam: 2/16/2025 8:22 PM EST Indication: PREOP Comparison: None available. Findings: There are no airspace consolidations. No pleural fluid. No pneumothorax. The pulmonary vasculature appears within normal limits. The cardiac and mediastinal silhouette appear unremarkable. No acute osseous abnormality identified.     Impression: Impression: No active disease. Electronically Signed: Julio Cesar Smith MD  2/16/2025 8:27 PM EST  Workstation ID: [id number]    CT Abdomen Pelvis With Contrast    Result Date: 2/16/2025  CT ABDOMEN PELVIS W CONTRAST Date of Exam: 2/16/2025 7:16 PM EST Indication: UPPER ABDOMINAL PAIN. Comparison: December 5, 2018 Technique: Axial CT images were obtained of the abdomen and pelvis following the uneventful intravenous administration of 75 cc of Isovue-300 . Reconstructed coronal and sagittal images were also obtained. Automated exposure control and iterative construction methods were used. Findings: There is some ascites about the liver and spleen extending into the pelvis. There are low attenuating lesions in the liver suggestive of hepatic cyst. There is some hypodensity in the medial segment of the left lobe which has been noted and could relate to more focal  fat. This is a common area for this. There is a vague area of decreased attenuation in the right lobe which has been noted and has not significantly changed suggesting more likely benign process. This might relate to more focal fat or could be perfusion related. There is no abnormality of the spleen. Pancreas grossly unremarkable. There is no definite adrenal gland abnormality. There are bilateral renal lesions suggestive of cysts. There are tiny nonobstructing intrarenal calculi. The bladder is contracted. There are abnormal dilated small bowel loops with thickened small bowel wall. This complected nonspecific enteritis. This has been described on prior reports. Does the patient have a history of inflammatory bowel disease? There is some edema within the mesentery. There are degenerative changes involving the lumbar spine. It looks like there is a mesh device along the anterior right lower abdominal wall.     Impression: Impression: 1.There is some ascites within the abdomen and pelvis. 2.Abnormal appearance to the small bowel which could relate to nonspecific enteritis. Does the patient have a history of inflammatory bowel disease? Findings were described on a prior CT in February 2024 at outside institution. The images are not available for direct comparison. This could be seen with inflammatory infectious processes as well as ischemia. 3.Hepatic and renal cysts. 4.Nonobstructing intrarenal calculi. 5.Vague area of decreased attenuation to the right lobe of the liver which has been noted on prior imaging. 6.Degenerative change lumbar spine. Report called to Dr. Kauffman in the emergency room. Electronically Signed: Elijah German MD  2/16/2025 7:58 PM EST  Workstation ID: [id number]    US Gallbladder    Result Date: 2/16/2025  US GALLBLADDER INDICATION: Abdominal pain with diarrhea. COMPARISON: CT abdomen pelvis 12/5/2018, gallbladder ultrasound 3/30/2018 FINDINGS: PANCREAS: Visualized portions of the pancreas are  within normal limits. LIVER: The liver has a coarsened heterogeneous echotexture throughout, but no focal liver lesion is seen. This may be the sequela of chronic liver disease.  The intrahepatic bile ducts are normal in caliber. The common duct is normal in size at the glenn  hepatis measuring 3 mm. GALLBLADDER: No gallbladder wall thickening is seen, and there are no gallstones. There is a 4 mm gallbladder wall polyp. RIGHT KIDNEY: The right kidney measures 9.6 cm. There is a probable renal cyst measuring about 1.7 cm. This has some low-level echoes, possibly due to artifact. There are scattered echogenic foci in the kidney that could reflect nonobstructing stones. No  hydronephrosis. OTHER: A trace amount of free fluid may be present adjacent to the liver. Main portal vein is patent with a normal hepatopetal direction of flow.     Impression: 1. Heterogeneous coarsened hepatic echotexture without a discrete mass may reflect chronic liver disease. 2. 4 mm gallbladder wall polyp. No gallstones or gallbladder wall thickening. No biliary obstruction. 3. Probable right renal cyst with potential nonobstructing right kidney stones. 4. Follow-up with a contrast-enhanced CT is recommended for further evaluation of the liver and right renal lesion. Electronically Signed: Romulo Nagy MD  2/16/2025 7:33 PM EST  Workstation ID: [id number]         Current medications:  Scheduled Meds:aspirin, 81 mg, Oral, Daily  dicyclomine, 10 mg, Oral, 4x Daily  [START ON 2/18/2025] dilTIAZem CD, 300 mg, Oral, Q24H  levothyroxine, 100 mcg, Oral, Daily  [START ON 2/18/2025] lisinopril, 20 mg, Oral, Daily  metoclopramide, 10 mg, Intravenous, Once  sodium chloride, 10 mL, Intravenous, Q12H      Continuous Infusions:sodium chloride, 100 mL/hr, Last Rate: 100 mL/hr (02/17/25 0931)      PRN Meds:.  acetaminophen **OR** acetaminophen **OR** acetaminophen    Calcium Replacement - Follow Nurse / BPA Driven Protocol    HYDROcodone-acetaminophen     Magnesium Standard Dose Replacement - Follow Nurse / BPA Driven Protocol    melatonin    Morphine **AND** naloxone    nitroglycerin    ondansetron    Phosphorus Replacement - Follow Nurse / BPA Driven Protocol    Potassium Replacement - Follow Nurse / BPA Driven Protocol    Sodium Chloride (PF)    [COMPLETED] Insert Peripheral IV **AND** sodium chloride    sodium chloride    sodium chloride    Assessment & Plan   Assessment & Plan     Active Hospital Problems    Diagnosis  POA    **Enteritis [K52.9]  Yes      Resolved Hospital Problems   No resolved problems to display.        Brief Hospital Course to date:  Cammie German is a 59 y.o. female with hx of hypertension, hypothyroidism, and breast cancer who presents due to nausea, vomiting, diarrhea, and abdominal pain x 2 days.     This patient's problems and plans were partially entered by my partner and updated as appropriate by me 02/17/25. Copied text in this note has been reviewed and is accurate as of today's date.     Enteritis, presumed infectious  Leukocytosis  Elevated Cr / Dehydration  Recent antibiotic use  --Reports eating at a restaurant two hours prior to onset of symptoms, associated chills  --Recently completed 10 day course of Amoxicillin for URI (dispensed 2/8/25); she also had courses of Amoxicillin in September and November 2024   --CT A/P with abnormal appearance of the small bowel which could relate to nonspecific enteritis, however she had similar appearance on CT scan from February 2024 raising concern for possible inflammatory bowel disease  --Awaiting stool sample to send for culture/viral panel and C. difficile assay  --Continue IV fluids, supportive care with antiemetics and pain meds  --Received IV Cefepime and Flagyl in the ED, will defer further antibiotics until C. difficile assay is back, in addition presume her symptoms are due to viral gastroenteritis and expect to be self-limited illness  --Appreciate GI evaluation - agree with  current management, recommend outpatient follow up once she has recovered from above illness to undergo EGD/colonoscopy/lab work for further evaluation for IBD  --Adding Bentyl  --Clear liquids, advance to GI soft today     Hypothyroidism  --Continue Synthroid     Hypertension  --Resume home meds tomorrow     History of breast cancer  --She states this was over 20 years ago, treatment for this is complete, no acute issues    Expected Discharge Location and Transportation: home  Expected Discharge   Expected Discharge Date: 2/18/2025; Expected Discharge Time:      VTE Prophylaxis:  Mechanical VTE prophylaxis orders are present.         AM-PAC 6 Clicks Score (PT): 24 (02/17/25 0800)    CODE STATUS:   Code Status and Medical Interventions: CPR (Attempt to Resuscitate); Full Support   Ordered at: 02/16/25 2029     Level Of Support Discussed With:    Patient     Code Status (Patient has no pulse and is not breathing):    CPR (Attempt to Resuscitate)     Medical Interventions (Patient has pulse or is breathing):    Full Support       Jennie Allen MD  02/17/25

## 2025-02-17 NOTE — DISCHARGE SUMMARY
[facility] Medicine Services  DISCHARGE SUMMARY    Patient Name: Cammie German  : 1965  MRN: [id number]    Date of Admission: 2025  6:05 PM  Date of Discharge:  25  Primary Care Physician: Romulo Woody MD    Consults       Date and Time Order Name Status Description    2025  8:29 PM Inpatient Gastroenterology Consult Completed             Hospital Course     Presenting Problem: N/V/D, diarrhea    Active Hospital Problems   No active problems to display.      Resolved Hospital Problems    Diagnosis Date Resolved POA    **Enteritis [K52.9] 2025 Yes          Hospital Course:  Cammie German is a 59 y.o. female with hx of hypertension, hypothyroidism, and breast cancer who presents due to nausea, vomiting, diarrhea, and abdominal pain x 2 days.      Enteritis, presumed infectious  Leukocytosis  Elevated Cr / Dehydration  Recent antibiotic use  --Reports eating at a restaurant two hours prior to onset of symptoms, associated chills  --Recently completed 10 day course of Amoxicillin for URI (dispensed 25); she also had courses of Amoxicillin in September and 2024   --CT A/P with abnormal appearance of the small bowel which could relate to nonspecific enteritis, however she had similar appearance on CT scan from 2024 raising concern for possible inflammatory bowel disease  --Received IV fluids and supportive care with antiemetics and pain meds  --Received IV Cefepime and Flagyl in the ED, deferred further antibiotics pending C.diff results, in addition presume her symptoms are due to viral gastroenteritis and expect to be self-limited illness which I discussed with her this morning  --C.diff PCR negative, there was not enough stool for full GI PCR panel - I recommended patient stay and repeat stool sample as it was quite small and we did not have enough for the full GI PCR panel, however patient does not want to stay as she is feeling better  and wants to go home  --Appreciate GI evaluation - agree with current management, recommend outpatient follow up once she has recovered from above illness to undergo EGD/colonoscopy/lab work for further evaluation for IBD  --Adding Bentyl - continue PRN at discharge, will also send PRN Zofran  --Tolerating GI soft diet today, continue to advance as tolerated upon discharge     Hypothyroidism  --Continue Synthroid      Hypertension  --Resume home meds at discharge     History of breast cancer  --She states this was over 20 years ago, treatment for this is complete, no acute issues      Discharge Follow Up Recommendations for outpatient labs/diagnostics:  F/U with PCP 1 week  Ambulatory referral to GI placed in Epic    Day of Discharge     HPI:   See progress note from same date      Vital Signs:   Temp:  [97.7 °F (36.5 °C)-99 °F (37.2 °C)] 98 °F (36.7 °C)  Heart Rate:  [64-88] 65  Resp:  [14-18] 14  BP: (103-125)/(53-82) 114/72      Physical Exam:  See progress note from same date    Pertinent  and/or Most Recent Results     LAB RESULTS:      Lab 02/17/25 0520 02/16/25 1848 02/16/25  1821   WBC 11.32*  --  21.25*   HEMOGLOBIN 12.3  --  15.9   HEMATOCRIT 38.2  --  48.5*   PLATELETS 295  --  480*   NEUTROS ABS 5.88  --  16.75*   IMMATURE GRANS (ABS) 0.03  --  0.10*   LYMPHS ABS 4.25*  --  3.35*   MONOS ABS 0.74  --  0.87   EOS ABS 0.36  --  0.11   MCV 89.3  --  86.8   LACTATE  --  1.6  --          Lab 02/17/25 0520 02/16/25  1848   SODIUM 139 136   POTASSIUM 3.7 4.4   CHLORIDE 107 98   CO2 27.0 27.0   ANION GAP 5.0 11.0   BUN 19 25*   CREATININE 0.97 1.29*   EGFR 66.6 47.3*   GLUCOSE 99 127*   CALCIUM 8.2* 9.6   MAGNESIUM 2.2  --          Lab 02/17/25  0520 02/16/25  1848   TOTAL PROTEIN 5.6* 6.8   ALBUMIN 3.4* 4.1   GLOBULIN 2.2 2.7   ALT (SGPT) 8 10   AST (SGOT) 9 12   BILIRUBIN 0.5 0.5   ALK PHOS 73 94   LIPASE  --  51         Lab 02/16/25 2005 02/16/25  1848   HSTROP T 9 15*                 Brief Urine Lab  Results  (Last result in the past 365 days)        Color   Clarity   Blood   Leuk Est   Nitrite   Protein   CREAT   Urine HCG        02/16/25 1820 Yellow   Cloudy   Trace   Trace   Negative   100 mg/dL (2+)                 Microbiology Results (last 10 days)       Procedure Component Value - Date/Time    Clostridioides difficile Toxin - Stool, Per Rectum [[id number]]  (Normal) Collected: 02/17/25 1541    Lab Status: Final result Specimen: Stool from Per Rectum Updated: 02/17/25 1703    Narrative:      The following orders were created for panel order Clostridioides difficile Toxin - Stool, Per Rectum.  Procedure                               Abnormality         Status                     ---------                               -----------         ------                     Clostridioides difficile...[[id number]]  Normal              Final result                 Please view results for these tests on the individual orders.    Clostridioides difficile Toxin, PCR - Stool, Per Rectum [[id number]]  (Normal) Collected: 02/17/25 1541    Lab Status: Final result Specimen: Stool from Per Rectum Updated: 02/17/25 1703     Toxigenic C. difficile by PCR Not Detected    Narrative:      The result indicates the absence of toxigenic C. difficile from stool specimen.     Urine Culture - Urine, Urine, Clean Catch [[id number]]  (Abnormal) Collected: 02/16/25 1820    Lab Status: Final result Specimen: Urine, Clean Catch Updated: 02/17/25 1152     Urine Culture <10,000 CFU/mL Gram Negative Bacilli     Comment: Call if further workup needed.        Narrative:      Colonization of the urinary tract without infection is common. Treatment is discouraged unless the patient is symptomatic, pregnant, or undergoing an invasive urologic procedure.            XR Chest 1 View    Result Date: 2/16/2025  XR CHEST 1 VW Date of Exam: 2/16/2025 8:22 PM EST Indication: PREOP Comparison: None available. Findings: There are no airspace consolidations. No  pleural fluid. No pneumothorax. The pulmonary vasculature appears within normal limits. The cardiac and mediastinal silhouette appear unremarkable. No acute osseous abnormality identified.     Impression: No active disease. Electronically Signed: Julio Cesar Smith MD  2/16/2025 8:27 PM EST  Workstation ID: [id number]    CT Abdomen Pelvis With Contrast    Result Date: 2/16/2025  CT ABDOMEN PELVIS W CONTRAST Date of Exam: 2/16/2025 7:16 PM EST Indication: UPPER ABDOMINAL PAIN. Comparison: December 5, 2018 Technique: Axial CT images were obtained of the abdomen and pelvis following the uneventful intravenous administration of 75 cc of Isovue-300 . Reconstructed coronal and sagittal images were also obtained. Automated exposure control and iterative construction methods were used. Findings: There is some ascites about the liver and spleen extending into the pelvis. There are low attenuating lesions in the liver suggestive of hepatic cyst. There is some hypodensity in the medial segment of the left lobe which has been noted and could relate to more focal fat. This is a common area for this. There is a vague area of decreased attenuation in the right lobe which has been noted and has not significantly changed suggesting more likely benign process. This might relate to more focal fat or could be perfusion related. There is no abnormality of the spleen. Pancreas grossly unremarkable. There is no definite adrenal gland abnormality. There are bilateral renal lesions suggestive of cysts. There are tiny nonobstructing intrarenal calculi. The bladder is contracted. There are abnormal dilated small bowel loops with thickened small bowel wall. This complected nonspecific enteritis. This has been described on prior reports. Does the patient have a history of inflammatory bowel disease? There is some edema within the mesentery. There are degenerative changes involving the lumbar spine. It looks like there is a mesh device along the  anterior right lower abdominal wall.     Impression: 1.There is some ascites within the abdomen and pelvis. 2.Abnormal appearance to the small bowel which could relate to nonspecific enteritis. Does the patient have a history of inflammatory bowel disease? Findings were described on a prior CT in February 2024 at outside institution. The images are not available for direct comparison. This could be seen with inflammatory infectious processes as well as ischemia. 3.Hepatic and renal cysts. 4.Nonobstructing intrarenal calculi. 5.Vague area of decreased attenuation to the right lobe of the liver which has been noted on prior imaging. 6.Degenerative change lumbar spine. Report called to Dr. Kauffman in the emergency room. Electronically Signed: Elijah German MD  2/16/2025 7:58 PM EST  Workstation ID: [id number]    US Gallbladder    Result Date: 2/16/2025  US GALLBLADDER INDICATION: Abdominal pain with diarrhea. COMPARISON: CT abdomen pelvis 12/5/2018, gallbladder ultrasound 3/30/2018 FINDINGS: PANCREAS: Visualized portions of the pancreas are within normal limits. LIVER: The liver has a coarsened heterogeneous echotexture throughout, but no focal liver lesion is seen. This may be the sequela of chronic liver disease.  The intrahepatic bile ducts are normal in caliber. The common duct is normal in size at the glenn  hepatis measuring 3 mm. GALLBLADDER: No gallbladder wall thickening is seen, and there are no gallstones. There is a 4 mm gallbladder wall polyp. RIGHT KIDNEY: The right kidney measures 9.6 cm. There is a probable renal cyst measuring about 1.7 cm. This has some low-level echoes, possibly due to artifact. There are scattered echogenic foci in the kidney that could reflect nonobstructing stones. No  hydronephrosis. OTHER: A trace amount of free fluid may be present adjacent to the liver. Main portal vein is patent with a normal hepatopetal direction of flow.     1. Heterogeneous coarsened hepatic echotexture  without a discrete mass may reflect chronic liver disease. 2. 4 mm gallbladder wall polyp. No gallstones or gallbladder wall thickening. No biliary obstruction. 3. Probable right renal cyst with potential nonobstructing right kidney stones. 4. Follow-up with a contrast-enhanced CT is recommended for further evaluation of the liver and right renal lesion. Electronically Signed: Romulo Nagy MD  2/16/2025 7:33 PM EST  Workstation ID: [id number]           Pending Labs       Order Current Status    Blood Culture - Blood, Arm, Left In process    Blood Culture - Blood, Arm, Right In process          Discharge Details        Discharge Medications        New Medications        Instructions Start Date   dicyclomine 10 MG capsule  Commonly known as: BENTYL   10 mg, Oral, 4 Times Daily PRN      ondansetron ODT 4 MG disintegrating tablet  Commonly known as: ZOFRAN-ODT   4 mg, Translingual, Every 8 Hours PRN             Continue These Medications        Instructions Start Date   aspirin 81 MG EC tablet   1 tablet, Oral, Daily      dilTIAZem HCl  MG 24 hr tablet  Commonly known as: CARDIZEM LA   1 tablet, Daily      hydroCHLOROthiazide 12.5 MG capsule  Commonly known as: MICROZIDE   12.5 mg, Every Morning      hydrOXYzine 10 MG tablet  Commonly known as: ATARAX   1 tablet, Oral, 3 Times Daily      levothyroxine 100 MCG tablet  Commonly known as: SYNTHROID, LEVOTHROID   1 tablet, Oral, Daily      lisinopril 40 MG tablet  Commonly known as: PRINIVIL,ZESTRIL   20 mg, Daily               No Known Allergies      Discharge Disposition:  Home or Self Care    Diet:  Hospital:  Diet Order   Procedures    Diet: Gastrointestinal; Fiber-Restricted, Low Irritant; Texture: Soft to Chew (NDD 3); Soft to Chew: Whole Meat; Fluid Consistency: Thin (IDDSI 0)       Diet Instructions       Advance Diet As Tolerated -Target Diet: advance to regular diet as tolerated      Target Diet: advance to regular diet as tolerated              Activity:  Activity Instructions       Activity as Tolerated                     CODE STATUS:    Code Status and Medical Interventions: CPR (Attempt to Resuscitate); Full Support   Ordered at: 02/16/25 2029     Level Of Support Discussed With:    Patient     Code Status (Patient has no pulse and is not breathing):    CPR (Attempt to Resuscitate)     Medical Interventions (Patient has pulse or is breathing):    Full Support       No future appointments.    Additional Instructions for the Follow-ups that You Need to Schedule       Discharge Follow-up with PCP   As directed       Currently Documented PCP:    Romulo Woody MD    PCP Phone Number:    [phone]     Follow Up Details: 1 week                      Jennie Allen MD  02/17/25

## 2025-02-17 NOTE — PROGRESS NOTES
Discharge Planning Assessment  [facility]     Patient Name: Cammie German  MRN: [id number]  Today's Date: 2/17/2025    Admit Date: 2/16/2025        Discharge Needs Assessment       Row Name 02/17/25 1210       Living Environment    Current Living Arrangements home       Discharge Needs Assessment    Equipment Currently Used at Home none                   Discharge Plan       Row Name 02/17/25 1210       Plan    Plan Comments Mrs. German lives in [place] and her insurance is [employer] commercial insurance coverage. She gets prescriptions filled at [facility] in [place]. Case management is following for discharge planning needs.    Final Discharge Disposition Code 01 - home or self-care                  Continued Care and Services - Admitted Since 2/16/2025    No active coordination exists for this encounter.          Demographic Summary       Row Name 02/17/25 1210       General Information    Arrived From home    Referral Source patient    Reason for Consult discharge planning    Preferred Language English       Contact Information    Permission Granted to Share Info With     Contact Information Obtained for                    Functional Status    No documentation.                  Psychosocial    No documentation.                  Abuse/Neglect    No documentation.                  Legal    No documentation.                  Substance Abuse    No documentation.                  Patient Forms    No documentation.                     TABATHA Snyder

## 2025-02-17 NOTE — CONSULTS
[facility]:  Inpatient Gastroenterology Consult      Inpatient Gastroenterology Consult  Consult performed by: Melvin Simental APRN  Consult ordered by: Guzman Wallace III, DO  Reason for consult: Enteritis, diarrhea      Referring Provider: No ref. provider found    PCP: Romulo Woody MD    Chief Complaint: Abdominal pain and diarrhea    History of present illness:  Cammie German is a 59 y.o. female who is admitted with worsening abdominal pain, nausea, vomiting, and diarrhea.  Patient notes she was at her usual state of health until Saturday evening when she was eating some [place] fried [place] and shortly thereafter started having some abdominal pain and distention that was subsequently followed by diarrhea.  Patient also notes along with onset she has been having issues with nausea and vomiting.  Once diarrhea began, patient began experiencing shaking chills but no reported fever.  No blood in stool.  Denies any prior episodes similar thereof.  Notes her spouse ate from [facility] as well but is not ill.  No changes in medication but patient did have a recent antibiotic course i.e. amoxicillin.  Reports that she does have a history of abdominal surgery stemming from a hernia that occurred after a TRAM flap.  Patient also notes an episode approximately 1 year ago with abdominal pain that was attributed to her prior herniorrhaphy.  At time of admission, imaging shows a diffuse enteritis.  There is leukocytosis.  Stool studies have not been collected at this time. Past medical, surgical, social, and family histories are reviewed for accuracy.  No documented alleviating or exacerbating factors.  Does not endorse pain at time of exam.      Allergies:  Patient has no known allergies.    Scheduled Meds:  metoclopramide, 10 mg, Intravenous, Once  sodium chloride, 10 mL, Intravenous, Q12H         Infusions:  sodium chloride, 100 mL/hr, Last Rate: 100 mL/hr (02/17/25 0931)        PRN  Meds:    acetaminophen **OR** acetaminophen **OR** acetaminophen    Calcium Replacement - Follow Nurse / BPA Driven Protocol    HYDROcodone-acetaminophen    Magnesium Standard Dose Replacement - Follow Nurse / BPA Driven Protocol    melatonin    Morphine **AND** naloxone    nitroglycerin    ondansetron    Phosphorus Replacement - Follow Nurse / BPA Driven Protocol    Potassium Replacement - Follow Nurse / BPA Driven Protocol    Sodium Chloride (PF)    [COMPLETED] Insert Peripheral IV **AND** sodium chloride    sodium chloride    sodium chloride    Home Meds:  Medications Prior to Admission   Medication Sig Dispense Refill Last Dose/Taking    dilTIAZem HCl ER (CARDIZEM LA) 300 MG 24 hr tablet Take 1 tablet by mouth Daily.   Taking    hydroCHLOROthiazide (MICROZIDE) 12.5 MG capsule Take 1 capsule by mouth Every Morning.   Taking    lisinopril (PRINIVIL,ZESTRIL) 40 MG tablet Take 0.5 tablets by mouth Daily.   Taking    aspirin 81 MG EC tablet Take 1 tablet by mouth Daily.       hydrOXYzine (ATARAX) 10 MG tablet Take 1 tablet by mouth 3 (Three) Times a Day.       levothyroxine (SYNTHROID, LEVOTHROID) 100 MCG tablet Take 1 tablet by mouth Daily.          ROS: Review of Systems   Constitutional:  Positive for chills and fatigue. Negative for activity change, appetite change, diaphoresis, fever and unexpected weight change.   HENT:  Negative for sore throat, trouble swallowing and voice change.    Eyes: Negative.    Respiratory:  Negative for apnea, cough, choking, chest tightness, shortness of breath, wheezing and stridor.    Cardiovascular:  Negative for chest pain, palpitations and leg swelling.   Gastrointestinal:  Positive for abdominal pain, diarrhea, nausea and vomiting. Negative for abdominal distention, anal bleeding, blood in stool, constipation and rectal pain.   Endocrine: Negative.    Genitourinary: Negative.    Musculoskeletal: Negative.    Skin: Negative.    Allergic/Immunologic: Negative.    Neurological:  "Negative.    Hematological:  Negative for adenopathy. Does not bruise/bleed easily.   Psychiatric/Behavioral: Negative.     All other systems reviewed and are negative.      PAST MED HX:  History reviewed. No pertinent past medical history.    PAST SURG HX:  History reviewed. No pertinent surgical history.    FAM HX:  History reviewed. No pertinent family history.    SOC HX:  Social History     Socioeconomic History    Marital status:    Tobacco Use    Smoking status: Never    Smokeless tobacco: Never   Vaping Use    Vaping status: Never Used   Substance and Sexual Activity    Alcohol use: Not Currently    Drug use: Never    Sexual activity: Defer       PHYSICAL EXAM  /65 (BP Location: Right arm, Patient Position: Lying)   Pulse 65   Temp 99 °F (37.2 °C) (Oral)   Resp 18   Ht 160 cm (63\")   Wt 77.1 kg (170 lb)   SpO2 93%   BMI 30.11 kg/m²   Wt Readings from Last 3 Encounters:   02/16/25 77.1 kg (170 lb)   ,body mass index is 30.11 kg/m².  Physical Exam  Vitals and nursing note reviewed.   Constitutional:       General: She is not in acute distress.     Appearance: Normal appearance. She is normal weight. She is not ill-appearing or toxic-appearing.   HENT:      Head: Normocephalic and atraumatic.   Eyes:      General: No scleral icterus.     Extraocular Movements: Extraocular movements intact.      Conjunctiva/sclera: Conjunctivae normal.      Pupils: Pupils are equal, round, and reactive to light.   Cardiovascular:      Rate and Rhythm: Normal rate and regular rhythm.      Pulses: Normal pulses.      Heart sounds: Normal heart sounds.   Pulmonary:      Effort: Pulmonary effort is normal. No respiratory distress.      Breath sounds: Normal breath sounds.   Abdominal:      General: Abdomen is flat. Bowel sounds are normal. There is no distension.      Palpations: Abdomen is soft. There is no mass.      Tenderness: There is abdominal tenderness. There is no guarding or rebound.      Hernia: No " hernia is present.   Skin:     General: Skin is warm and dry.      Coloration: Skin is not jaundiced or pale.   Neurological:      General: No focal deficit present.      Mental Status: She is alert and oriented to person, place, and time.   Psychiatric:         Mood and Affect: Mood normal.         Behavior: Behavior normal.         Thought Content: Thought content normal.         Judgment: Judgment normal.         Results Review:   I reviewed the patient's new clinical results.  I reviewed the patient's new imaging results and agree with the interpretation.  I reviewed the patient's other test results and agree with the interpretation  I personally viewed and interpreted the patient's EKG/Telemetry data    Lab Results   Component Value Date    WBC 11.32 (H) 02/17/2025    HGB 12.3 02/17/2025    HGB 15.9 02/16/2025    HGB 11.9 03/01/2024    HCT 38.2 02/17/2025    MCV 89.3 02/17/2025     02/17/2025       Lab Results   Component Value Date    INR 1 11/07/2023       Lab Results   Component Value Date    GLUCOSE 99 02/17/2025    BUN 19 02/17/2025    CREATININE 0.97 02/17/2025    EGFRIFNONA 48 (L) 09/27/2021    EGFRIFAFRI 64 02/29/2024    BCR 19.6 02/17/2025     02/17/2025    K 3.7 02/17/2025    CO2 27.0 02/17/2025    CALCIUM 8.2 (L) 02/17/2025    ALBUMIN 3.4 (L) 02/17/2025    ALKPHOS 73 02/17/2025    BILITOT 0.5 02/17/2025    ALT 8 02/17/2025    AST 9 02/17/2025       XR Chest 1 View    Result Date: 2/16/2025  XR CHEST 1 VW Date of Exam: 2/16/2025 8:22 PM EST Indication: PREOP Comparison: None available. Findings: There are no airspace consolidations. No pleural fluid. No pneumothorax. The pulmonary vasculature appears within normal limits. The cardiac and mediastinal silhouette appear unremarkable. No acute osseous abnormality identified.     Impression: No active disease. Electronically Signed: Julio Cesar Smith MD  2/16/2025 8:27 PM EST  Workstation ID: [id number]    CT Abdomen Pelvis With Contrast    Result  Date: 2/16/2025  CT ABDOMEN PELVIS W CONTRAST Date of Exam: 2/16/2025 7:16 PM EST Indication: UPPER ABDOMINAL PAIN. Comparison: December 5, 2018 Technique: Axial CT images were obtained of the abdomen and pelvis following the uneventful intravenous administration of 75 cc of Isovue-300 . Reconstructed coronal and sagittal images were also obtained. Automated exposure control and iterative construction methods were used. Findings: There is some ascites about the liver and spleen extending into the pelvis. There are low attenuating lesions in the liver suggestive of hepatic cyst. There is some hypodensity in the medial segment of the left lobe which has been noted and could relate to more focal fat. This is a common area for this. There is a vague area of decreased attenuation in the right lobe which has been noted and has not significantly changed suggesting more likely benign process. This might relate to more focal fat or could be perfusion related. There is no abnormality of the spleen. Pancreas grossly unremarkable. There is no definite adrenal gland abnormality. There are bilateral renal lesions suggestive of cysts. There are tiny nonobstructing intrarenal calculi. The bladder is contracted. There are abnormal dilated small bowel loops with thickened small bowel wall. This complected nonspecific enteritis. This has been described on prior reports. Does the patient have a history of inflammatory bowel disease? There is some edema within the mesentery. There are degenerative changes involving the lumbar spine. It looks like there is a mesh device along the anterior right lower abdominal wall.     Impression: 1.There is some ascites within the abdomen and pelvis. 2.Abnormal appearance to the small bowel which could relate to nonspecific enteritis. Does the patient have a history of inflammatory bowel disease? Findings were described on a prior CT in February 2024 at outside institution. The images are not available  for direct comparison. This could be seen with inflammatory infectious processes as well as ischemia. 3.Hepatic and renal cysts. 4.Nonobstructing intrarenal calculi. 5.Vague area of decreased attenuation to the right lobe of the liver which has been noted on prior imaging. 6.Degenerative change lumbar spine. Report called to Dr. Kauffman in the emergency room. Electronically Signed: Elijah German MD  2/16/2025 7:58 PM EST  Workstation ID: [id number]    US Gallbladder    Result Date: 2/16/2025  US GALLBLADDER INDICATION: Abdominal pain with diarrhea. COMPARISON: CT abdomen pelvis 12/5/2018, gallbladder ultrasound 3/30/2018 FINDINGS: PANCREAS: Visualized portions of the pancreas are within normal limits. LIVER: The liver has a coarsened heterogeneous echotexture throughout, but no focal liver lesion is seen. This may be the sequela of chronic liver disease.  The intrahepatic bile ducts are normal in caliber. The common duct is normal in size at the glenn  hepatis measuring 3 mm. GALLBLADDER: No gallbladder wall thickening is seen, and there are no gallstones. There is a 4 mm gallbladder wall polyp. RIGHT KIDNEY: The right kidney measures 9.6 cm. There is a probable renal cyst measuring about 1.7 cm. This has some low-level echoes, possibly due to artifact. There are scattered echogenic foci in the kidney that could reflect nonobstructing stones. No  hydronephrosis. OTHER: A trace amount of free fluid may be present adjacent to the liver. Main portal vein is patent with a normal hepatopetal direction of flow.     1. Heterogeneous coarsened hepatic echotexture without a discrete mass may reflect chronic liver disease. 2. 4 mm gallbladder wall polyp. No gallstones or gallbladder wall thickening. No biliary obstruction. 3. Probable right renal cyst with potential nonobstructing right kidney stones. 4. Follow-up with a contrast-enhanced CT is recommended for further evaluation of the liver and right renal lesion. Electronically  Signed: Romulo Nagy MD  2/16/2025 7:33 PM EST  Workstation ID: [id number]       ASSESSMENTS/PLANS  1.  Acute gastroenteritis  2.  Nausea, vomiting, diarrhea, related to above  3.  History of breast cancer, s/p TRAM flap  4.  Abdominal hernia, s/p herniorrhaphy with mesh in 2023  5.  Heterogeneous coarsened hepatic echotexture, concerns for underlying liver disease, small amount of ascites in abdomen of indeterminate etiology.    Cammie German is a 59 y.o. female who presents to hospital with gastroenteritis-like symptoms with significant nausea, vomiting, diarrhea, and abdominal pain.  Overall, favor infectious etiology of symptoms.  Await stool studies for further evaluation.  Discussed with patient that, given likely infectious etiology, she should expect postviral irritable bowel syndrome for the next several weeks.     Of concern, patient did have a CT scan at the [facility] last year that found evidence of enteritis at that time of indeterminate etiology.  As she has had 2 scans within the last year with similar findings, we will have patient follow-up with gastroenterology in the outpatient setting for complete IBD evaluation; i.e. colonoscopy, EGD, and inflammatory markers.    Additionally, noted the patient has a heterogeneous and coarsened hepatic echotexture on ultrasound; she does have a history of known hemangioma and abnormal imaging of the liver which has been followed outpatient.  When following up with GI, we will plan to obtain updated MRI of liver with contrast for further evaluation.    >>> Okay for diet as tolerated; start with clear liquids and advance from there.  >>> Obtain GI PCR and C. difficile assay  >>> Trial Bentyl for abdominal pain and cramping  >>> Continue as needed antiemetics  >>> Patient will need close outpatient follow-up with GI at discharge .      I discussed the patient's findings and my recommendations with patient, family, nursing staff, and consulting  provider.    Melvin Simental, [place]  02/17/25  14:30 EST

## 2025-02-17 NOTE — PLAN OF CARE
Pt admitted from ED. VSS. Reported abdominal pain, PRN administered.    Goal Outcome Evaluation:  Plan of Care Reviewed With: patient

## 2025-02-18 NOTE — ED PROVIDER NOTES
Subjective   History of Present Illness  59-year-old female presents for evaluation abdominal pain with associated nausea vomiting and diarrhea.  She also reports chills.  Symptoms began Thursday and have now persisted for 4 days.  She denies any definitive sick contacts.  No blood in the stool.  No upper respiratory congestion, sore throat, rhinorrhea.      Review of Systems   Constitutional:  Positive for activity change, appetite change and fatigue. Negative for chills and fever.   HENT:  Negative for congestion, ear pain, postnasal drip, sinus pressure and sore throat.    Eyes:  Negative for pain, redness and visual disturbance.   Respiratory:  Negative for cough, chest tightness and shortness of breath.    Cardiovascular:  Negative for chest pain, palpitations and leg swelling.   Gastrointestinal:  Positive for abdominal pain, diarrhea, nausea and vomiting. Negative for anal bleeding and blood in stool.   Endocrine: Negative for polydipsia and polyuria.   Genitourinary:  Negative for difficulty urinating, dysuria, frequency and urgency.   Musculoskeletal:  Negative for arthralgias, back pain and neck pain.   Skin:  Negative for pallor and rash.   Allergic/Immunologic: Negative for environmental allergies and immunocompromised state.   Neurological:  Negative for dizziness, weakness and headaches.   Hematological:  Negative for adenopathy.   Psychiatric/Behavioral:  Negative for confusion, self-injury and suicidal ideas. The patient is not nervous/anxious.    All other systems reviewed and are negative.      History reviewed. No pertinent past medical history.    No Known Allergies    History reviewed. No pertinent surgical history.    History reviewed. No pertinent family history.    Social History     Socioeconomic History    Marital status:    Tobacco Use    Smoking status: Never    Smokeless tobacco: Never   Vaping Use    Vaping status: Never Used   Substance and Sexual Activity    Alcohol use: Not  Currently    Drug use: Never    Sexual activity: Defer           Objective   Physical Exam  Vitals and nursing note reviewed.   Constitutional:       General: She is not in acute distress.     Appearance: Normal appearance. She is well-developed. She is not toxic-appearing or diaphoretic.   HENT:      Head: Normocephalic and atraumatic.      Right Ear: External ear normal.      Left Ear: External ear normal.      Nose: Nose normal.   Eyes:      General: Lids are normal.      Pupils: Pupils are equal, round, and reactive to light.   Neck:      Trachea: No tracheal deviation.   Cardiovascular:      Rate and Rhythm: Normal rate and regular rhythm.      Pulses: No decreased pulses.      Heart sounds: Normal heart sounds. No murmur heard.     No friction rub. No gallop.   Pulmonary:      Effort: Pulmonary effort is normal. No respiratory distress.      Breath sounds: Normal breath sounds. No decreased breath sounds, wheezing, rhonchi or rales.   Abdominal:      General: Bowel sounds are normal.      Palpations: Abdomen is soft.      Tenderness: There is generalized abdominal tenderness. There is no guarding or rebound.   Musculoskeletal:         General: No deformity. Normal range of motion.      Cervical back: Normal range of motion and neck supple.   Lymphadenopathy:      Cervical: No cervical adenopathy.   Skin:     General: Skin is warm and dry.      Findings: No rash.   Neurological:      Mental Status: She is alert and oriented to person, place, and time.      Cranial Nerves: No cranial nerve deficit.      Sensory: No sensory deficit.   Psychiatric:         Speech: Speech normal.         Behavior: Behavior normal.         Thought Content: Thought content normal.         Judgment: Judgment normal.         Procedures           ED Course  ED Course as of 02/18/25 1554   Sun Feb 16, 2025 2000 The patient presents with a complaint of severe persistent pain in the right abdomen after eating some [employer] on Friday.  She  also reports developing chills today.  No previous history of C. difficile.  However she recently has been taking some antibiotics.  She does report having watery diarrhea.  Labs show significant leukocytosis.  CT scan shows acute small bowel wall thickening with some associated ascites concerning for enteritis.  The radiologist inquired about a history of Crohn's disease, as she has had similar exacerbations the past, but there is no previously reported history of Crohn's disease.  Given the patient's significant leukocytosis and pain I have initiated antibiotics, pain medication, and would like to admit for further symptom management and fluids.  C. difficile and GI panel are pending.  Stool sample not yet obtained. [NS]      ED Course User Index  [NS] Sharan Kauffman MD                                                       Medical Decision Making  Differential includes gastritis, dehydration, constipation, C. difficile, electrolyte abnormality, renal insufficiency, other unspecified etiology.    Urine shows 1+ bacteria, 6-10 white blood cells.    Lactic acid level was normal.    Normal kidney function with no significant electrolyte abnormalities.  Mild leukocytosis.    Symptoms began after eating [employer].  Radiologist reports concern for Crohn's, with acute small bowel wall thickening concerning for enteritis.    Patient appears very uncomfortable.  I discussed the patient with the hospitalist.  I have also initiated IV fluids, pain medication, and IV antibiotics.    Problems Addressed:  Acute abdominal pain: complicated acute illness or injury with systemic symptoms  Infectious enteritis, unspecified infectious agent: complicated acute illness or injury with systemic symptoms  Intractable abdominal pain: complicated acute illness or injury with systemic symptoms    Amount and/or Complexity of Data Reviewed  Independent Historian: spouse     Details:  provides additional information.  External Data  Reviewed: labs, radiology and ECG.  Labs: ordered. Decision-making details documented in ED Course.  Radiology: ordered and independent interpretation performed. Decision-making details documented in ED Course.  ECG/medicine tests: ordered and independent interpretation performed. Decision-making details documented in ED Course.     Details: EKG performed 2/16/2025 at 1924 independently interpreted by myself shows sinus rhythm, normal QTc, normal QRS, no acute ischemic changes.    Risk  Prescription drug management.  Decision regarding hospitalization.        Final diagnoses:   Infectious enteritis, unspecified infectious agent   Acute abdominal pain   Intractable abdominal pain       ED Disposition  ED Disposition       ED Disposition   Decision to Admit    Condition   --    Comment   Level of Care: Telemetry [5]   Diagnosis: Enteritis [311617]   Admitting Physician: CHHAYA BOYER III [[id number]]   Attending Physician: CHHAYA BOYER III [[id number]]   Is patient appropriate for Inpatient Observation Unit?: No [0]   Bed Request Comments: tele obs not cdu                 Romulo Woody MD  [address] [facility] [address]  [place] [id number] [phone]      1 week         Medication List        New Prescriptions      dicyclomine 10 MG capsule  Commonly known as: BENTYL  Take 1 capsule by mouth 4 (Four) Times a Day As Needed for Abdominal Cramping.     ondansetron ODT 4 MG disintegrating tablet  Commonly known as: ZOFRAN-ODT  Place 1 tablet on the tongue Every 8 (Eight) Hours As Needed for Nausea or Vomiting.               Where to Get Your Medications        These medications were sent to [facility]/pharmacy #[id number] - [place], [place] - [address] [place] [address] AT CORNER OF [address] [phone] [facility] [phone] [place] [id number]      Hours: 24-hours Phone: [phone]   dicyclomine 10 MG capsule  ondansetron ODT 4 MG disintegrating tablet            Sharan Kauffman,  MD  02/18/25 1550

## 2025-02-21 LAB
BACTERIA SPEC AEROBE CULT: NORMAL
BACTERIA SPEC AEROBE CULT: NORMAL

## 2025-03-26 LAB
QT INTERVAL: 422 MS
QTC INTERVAL: 455 MS